# Patient Record
(demographics unavailable — no encounter records)

---

## 2025-03-04 NOTE — DISCUSSION/SUMMARY
[de-identified] : General Dx Discussion The patient was advised of the diagnosis. The natural history of the pathology was explained in full to the patient in layman's terms. All questions were answered. The risks and benefits of surgical and non-surgical treatment alternatives were explained in full to the patient.  Case Discussed. Will get an mri right shoulder for further evaluation. In the interim recommend Naprosyn 500mg daily for 5 days, then as needed. Flexeril 5mg once at night as needed. Will refer to Dr. Schultz/[facility] for further evaluation of her neck. f/u after mri shoulder.  Patient was given a prescription for an anti-inflammatory medication.  They will take it for the next 5-7 days and then on an as needed basis, as long as there are no medical contra-indications.  Patient is counseled on possible GI and blood pressure side effects.    Entered by Megan BLANK acting as a scribe. Instructions: Dr. Lewis- The documentation recorded by the scribe accurately reflects the service I personally performed and the decisions made by me.

## 2025-03-04 NOTE — DATA REVIEWED
[Outside X-rays] : outside x-rays [Right] : of the right [Shoulder] : shoulder [Report was reviewed and noted in the chart] : The report was reviewed and noted in the chart [FreeTextEntry1] : antoni 2/27/25  normal right shoulder radiographs
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
## 2025-03-04 NOTE — DISCUSSION/SUMMARY
[de-identified] : General Dx Discussion The patient was advised of the diagnosis. The natural history of the pathology was explained in full to the patient in layman's terms. All questions were answered. The risks and benefits of surgical and non-surgical treatment alternatives were explained in full to the patient.  Case Discussed. Will get an mri right shoulder for further evaluation. In the interim recommend Naprosyn 500mg daily for 5 days, then as needed. Flexeril 5mg once at night as needed. Will refer to Dr. Schultz/[facility] for further evaluation of her neck. f/u after mri shoulder.  Patient was given a prescription for an anti-inflammatory medication.  They will take it for the next 5-7 days and then on an as needed basis, as long as there are no medical contra-indications.  Patient is counseled on possible GI and blood pressure side effects.    Entered by Megan BLANK acting as a scribe. Instructions: Dr. Lewis- The documentation recorded by the scribe accurately reflects the service I personally performed and the decisions made by me.

## 2025-03-04 NOTE — PHYSICAL EXAM
[Right] : right shoulder [Standing] : standing [Mild] : mild [] : discomfort with strength testing [TWNoteComboBox7] : active forward flexion 130 degrees
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
## 2025-03-18 NOTE — DISCUSSION/SUMMARY
[de-identified] : General Dx Discussion The patient was advised of the diagnosis. The natural history of the pathology was explained in full to the patient in layman's terms. All questions were answered. The risks and benefits of surgical and non-surgical treatment alternatives were explained in full to the patient.  Case Discussed. will try a injection and PT  poss of surgery discussed  she is going to see Dr Schultz for her neck

## 2025-03-18 NOTE — PHYSICAL EXAM
[Right] : right shoulder [5 ___] : forward flexion 5[unfilled]/5 [5___] : internal rotation 5[unfilled]/5 [] : discomfort with strength testing [TWNoteComboBox7] : active forward flexion 120 degrees [de-identified] : active abduction 75 degrees [TWNoteComboBox6] : internal rotation 50 degrees [de-identified] : external rotation 55 degrees

## 2025-03-18 NOTE — PROCEDURE
[Large Joint Injection] : Large joint injection [Right] : of the right [Subacromial Space] : subacromial space [Pain] : pain [Inflammation] : inflammation [Alcohol] : alcohol [Betadine] : betadine [Ethyl Chloride sprayed topically] : ethyl chloride sprayed topically [Sterile technique used] : sterile technique used [___ cc    3mg] :  Betamethasone (Celestone) ~Vcc of 3mg [___ cc    1%] : Lidocaine ~Vcc of 1%  [___ cc    0.25%] : Bupivacaine (Marcaine) ~Vcc of 0.25%  [] : Patient tolerated procedure well [Call if redness, pain or fever occur] : call if redness, pain or fever occur [Apply ice for 15min out of every hour for the next 12-24 hours as tolerated] : apply ice for 15 minutes out of every hour for the next 12-24 hours as tolerated [Previous OTC use and PT nontherapeutic] : patient has tried OTC's including aspirin, Ibuprofen, Aleve, etc or prescription NSAIDS, and/or exercises at home and/or physical therapy without satisfactory response [Patient had decreased mobility in the joint] : patient had decreased mobility in the joint [Risks, benefits, alternatives discussed / Verbal consent obtained] : the risks benefits, and alternatives have been discussed, and verbal consent was obtained [Prior failure or difficult injection] : prior failure or difficult injection [All ultrasound images have been permanently captured and stored accordingly in our picture archiving and communication system] : All ultrasound images have been permanently captured and stored accordingly in our picture archiving and communication system [Visualization of the needle and placement of injection was performed without complication] : visualization of the needle and placement of injection was performed without complication

## 2025-03-18 NOTE — DATA REVIEWED
[Report was reviewed and noted in the chart] : The report was reviewed and noted in the chart [I independently reviewed and interpreted images and report] : I independently reviewed and interpreted images and report [I reviewed the films/CD] : I reviewed the films/CD [FreeTextEntry1] : near full thickness RCT SLAP synovitis bursitis impingement
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
## 2025-03-21 NOTE — PHYSICAL EXAM
[de-identified] : Constitutional: - General Appearance: Unremarkable Body Habitus Well Developed Well Nourished Body Habitus No Deformities Well Groomed Ability To communicate: Normal Neurologic: Global sensation is intact to upper and lower extremities. See examination of Neck and/or Spine for exceptions. Orientation to Time, Place and Person is: Normal Mood And Affect is Normal Skin: - Head/Face, Right Upper/Lower Extremity, Left Upper/Lower Extremity: Normal See Examination of Neck and/or Spine for exceptions Cardiovascular: Peripheral Cardiovascular System is Normal Palpation of Lymph Nodes: Normal Palpation of lymph nodes in: Axilla, Cervical, Inguinal Abnormal Palpation of lymph nodes in: None  [Biceps 2+] : biceps 2+ [Triceps 2+] : triceps 2+ [Brachioradialis 2+] : brachioradialis 2+ [] : negative Miller reflex [TWNoteComboBox7] : forward flexion 20 degrees [de-identified] : extension 10 degrees [de-identified] : left lateral rotation 45 degrees [TWNoteComboBox6] : right lateral rotation 45 degrees
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
## 2025-03-21 NOTE — DATA REVIEWED
[FreeTextEntry1] : On my interpretations of these images from [facility] in house on 03/21/2025: I have independently reviewed and interpreted these images.  4 V cervical XR- mod-adv of C3-7, flex/ex XRs reveal limited extension and flexion but no instability.

## 2025-04-23 NOTE — DISCUSSION/SUMMARY
[de-identified] : General Dx Discussion The patient was advised of the diagnosis. The natural history of the pathology was explained in full to the patient in layman's terms. All questions were answered. The risks and benefits of surgical and non-surgical treatment alternatives were explained in full to the patient.  Case Discussed. Surg/Nonsurg options discussed. She has failed conservative treatment therefore would recommend surgical procedure at this time.  MRI reviewed again has with pain and loss of motion  Arthroscopy with decompression, debridement RCR discussed Risks, benefits and alternatives discussed. Risks include, but are not limited to infection, blood clot, nerve damage, recurrent tear, loss of motion, continued pain, worsened pain, need for another surgery in the future. Recurrent tear discussed  Discussed that the surgery will not address any pain that she has from any OA she may have. She understands he will not be 100% better after surgery. Prolonged immobilization and rehabilitation discussed. Work limitations discussed. Questions answered. She expressed understanding and would like to proceed. She is seeing Dr Schultz for her neck.   The patient was advised of the diagnosis.  The natural history of the pathology was explained in full to the patient in layman's terms. All questions were answered.  The risks and benefits of surgical and non-surgical treatment alternatives were explained in full to the patient.   The patient demonstrated a full understanding of the surgical and non-surgical options.  The risks of surgery were outlined in full to the patient including but not limited to bleeding, scarring, infection, sepsis, neurologic injury, vascular injury, failure to resolve symptoms, symptom recurrence, the need for further surgery, non-healing, wound breakdown, deep vein thrombosis, pulmonary embolism, spontaneous osteonecrosis, anesthesia complications and even death.  The patient understood all the risks and accepted them and understood that other complications could occur that are not mentioned above.  The intraoperative plan, post-operative plan, post-operative expectations and limitations were explained in full.  Expectations from non-surgical treatment were explained in full as well.  The patient demonstrated a complete understanding of the treatment alternatives and requested the above-mentioned procedure.  This will be scheduled accordingly.    Entered by Megan BLANK acting as a scribe. Instructions: Dr. Lewis- The documentation recorded by the scribe accurately reflects the service I personally performed and the decisions made by me.
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
## 2025-04-23 NOTE — DISCUSSION/SUMMARY
[de-identified] : General Dx Discussion The patient was advised of the diagnosis. The natural history of the pathology was explained in full to the patient in layman's terms. All questions were answered. The risks and benefits of surgical and non-surgical treatment alternatives were explained in full to the patient.  Case Discussed. Surg/Nonsurg options discussed. She has failed conservative treatment therefore would recommend surgical procedure at this time.  MRI reviewed again has with pain and loss of motion  Arthroscopy with decompression, debridement RCR discussed Risks, benefits and alternatives discussed. Risks include, but are not limited to infection, blood clot, nerve damage, recurrent tear, loss of motion, continued pain, worsened pain, need for another surgery in the future. Recurrent tear discussed  Discussed that the surgery will not address any pain that she has from any OA she may have. She understands he will not be 100% better after surgery. Prolonged immobilization and rehabilitation discussed. Work limitations discussed. Questions answered. She expressed understanding and would like to proceed. She is seeing Dr Schultz for her neck.   The patient was advised of the diagnosis.  The natural history of the pathology was explained in full to the patient in layman's terms. All questions were answered.  The risks and benefits of surgical and non-surgical treatment alternatives were explained in full to the patient.   The patient demonstrated a full understanding of the surgical and non-surgical options.  The risks of surgery were outlined in full to the patient including but not limited to bleeding, scarring, infection, sepsis, neurologic injury, vascular injury, failure to resolve symptoms, symptom recurrence, the need for further surgery, non-healing, wound breakdown, deep vein thrombosis, pulmonary embolism, spontaneous osteonecrosis, anesthesia complications and even death.  The patient understood all the risks and accepted them and understood that other complications could occur that are not mentioned above.  The intraoperative plan, post-operative plan, post-operative expectations and limitations were explained in full.  Expectations from non-surgical treatment were explained in full as well.  The patient demonstrated a complete understanding of the treatment alternatives and requested the above-mentioned procedure.  This will be scheduled accordingly.    Entered by Megan BLANK acting as a scribe. Instructions: Dr. Lewis- The documentation recorded by the scribe accurately reflects the service I personally performed and the decisions made by me.
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
## 2025-04-23 NOTE — PHYSICAL EXAM
[Right] : right shoulder [5 ___] : forward flexion 5[unfilled]/5 [5___] : internal rotation 5[unfilled]/5 [] : discomfort with strength testing [TWNoteComboBox7] : active forward flexion 120 degrees [de-identified] : active abduction 75 degrees [TWNoteComboBox6] : internal rotation 50 degrees [de-identified] : external rotation 55 degrees

## 2025-04-23 NOTE — PHYSICAL EXAM
[Right] : right shoulder [5 ___] : forward flexion 5[unfilled]/5 [5___] : internal rotation 5[unfilled]/5 [] : discomfort with strength testing [TWNoteComboBox7] : active forward flexion 120 degrees [de-identified] : active abduction 75 degrees [TWNoteComboBox6] : internal rotation 50 degrees [de-identified] : external rotation 55 degrees

## 2025-05-16 NOTE — PHYSICAL EXAM
[Biceps 2+] : biceps 2+ [Triceps 2+] : triceps 2+ [Brachioradialis 2+] : brachioradialis 2+ [de-identified] : Constitutional: - General Appearance: Unremarkable Body Habitus Well Developed Well Nourished Body Habitus No Deformities Well Groomed Ability To communicate: Normal Neurologic: Global sensation is intact to upper and lower extremities. See examination of Neck and/or Spine for exceptions. Orientation to Time, Place and Person is: Normal Mood And Affect is Normal Skin: - Head/Face, Right Upper/Lower Extremity, Left Upper/Lower Extremity: Normal See Examination of Neck and/or Spine for exceptions Cardiovascular: Peripheral Cardiovascular System is Normal Palpation of Lymph Nodes: Normal Palpation of lymph nodes in: Axilla, Cervical, Inguinal Abnormal Palpation of lymph nodes in: None  [] : negative Miller reflex [FreeTextEntry8] : tenderness over spinous process.  [FreeTextEntry9] : painful rotation L>R [TWNoteComboBox7] : False [de-identified] : extension 10 degrees [de-identified] : left lateral rotation 45 degrees [TWNoteComboBox6] : right lateral rotation 45 degrees
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
## 2025-05-16 NOTE — DATA REVIEWED
[FreeTextEntry1] : On my interpretation of these images from 3/22/25 on [facility]. I have additionally reviewed the radiologist report.  MRI-  C2-3: NL C3-4: mild DDD, central protrusion, mild BL FS.  C4-5:  mild-mod DDD, mod RT FS, mild-mod central, abutment but no compression of spinal cord  C5-6:  mod DDD w/ disc osteophyte, results in mod central and BL foraminal stenosis. very mild LT side spinal cord compression.   C6-7: mild DDD, mild central stenosis.  C7-T1 mild DDD, no stenosis.   RT shoulder MRI 3/8/25. Severe partial tears of RTC w/ bursitis, labral tear, biceps tendon, AC straight.   On my interpretations of these images from [facility] in house on 03/21/2025: I have independently reviewed and interpreted these images.  4 V cervical XR- mod-adv of C3-7, flex/ex XRs reveal limited extension and flexion but no instability.

## 2025-05-20 NOTE — DATA REVIEWED
[Right] : of the right [Shoulder] : shoulder [MRI] : MRI [Cervical Spine] : cervical spine [Report was reviewed and noted in the chart] : The report was reviewed and noted in the chart [I reviewed the films/CD] : I reviewed the films/CD
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
## 2025-05-22 NOTE — ASSESSMENT
[FreeTextEntry1] : We discussed the nature of the underlying pathology and available pain management treatment options. These included interventional, rehabilitative, pharmacological, and complementary modalities. We will proceed with the following:    Interventional treatment options: - Proceed with _ with fluoroscopic guidance - None indicated at present time  - see additional instructions below    Rehabilitative options: - Initiate trial of physical therapy - Continue physical therapy - Participation in active HEP was discussed and encouraged as tolerated - Home exercise sheet provided   Medication based treatment options: - Initiate trial of _ - Continue _ - See additional instructions below    Complementary treatment options: - Weight management and lifestyle modifications discussed   Additional treatment recommendations as follows: - patient will follow up _  ITess, acting as scribe, attest that this documentation has been prepared under the direction and in the presence of Provider Chapo Ziegler DO.  The documentation recorded by the scribe, in my presence, accurately reflects the service I personally performed, and the decisions made by me with my edits as appropriate.

## 2025-05-22 NOTE — PHYSICAL EXAM
[de-identified] : Constitutional: - No acute distress - Well developed; well nourished   Neurological: - normal mood and affect - alert and oriented x 3   Cardiovascular: - grossly normal  Cervical Spine Exam:   Inspection: erythema (-) ecchymosis (-) rashes (-)   Palpation:                                               Cervical paraspinal tenderness:         R (-); L (-) Upper trapezius tenderness:              R (-); L (-) Rhomboids tenderness:                      R (-); L (-) Occipital Ridge:                                   R (-); L (-) Supraspinatus tenderness:                 R (-); L (-)   ROM: WNL   Strength Testing:            Right    Left Deltoid                             (5/5)    (5/5) Biceps:                            (5/5)    (5/5) Triceps:                           (5/5)    (5/5) Finger Abductors:           (5/5)    (5/5) Grasp:                             (5/5)    (5/5)   Special Testing: Spurling Test:                  R (-); L (-) Facet load test:               R (-); L (-) Miller test:                  R (-); L (-)   Neuro: SILT throughout right upper extremity SI LT throughout left upper extremity   Reflexes: Biceps   -           R (2+); L (2+) Triceps  -           R (2+); L (2+) Brachioradialis- R (2+); L (2+)   No ankle clonus

## 2025-05-22 NOTE — PHYSICAL EXAM
[de-identified] : Constitutional: - No acute distress - Well developed; well nourished   Neurological: - normal mood and affect - alert and oriented x 3   Cardiovascular: - grossly normal  Cervical Spine Exam:   Inspection: erythema (-) ecchymosis (-) rashes (-)   Palpation:                                               Cervical paraspinal tenderness:         R (-); L (-) Upper trapezius tenderness:              R (-); L (-) Rhomboids tenderness:                      R (-); L (-) Occipital Ridge:                                   R (-); L (-) Supraspinatus tenderness:                 R (-); L (-)   ROM: WNL   Strength Testing:            Right    Left Deltoid                             (5/5)    (5/5) Biceps:                            (5/5)    (5/5) Triceps:                           (5/5)    (5/5) Finger Abductors:           (5/5)    (5/5) Grasp:                             (5/5)    (5/5)   Special Testing: Spurling Test:                  R (-); L (-) Facet load test:               R (-); L (-) Miller test:                  R (-); L (-)   Neuro: SILT throughout right upper extremity SI LT throughout left upper extremity   Reflexes: Biceps   -           R (2+); L (2+) Triceps  -           R (2+); L (2+) Brachioradialis- R (2+); L (2+)   No ankle clonus
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
## 2025-05-27 NOTE — DISCUSSION/SUMMARY
[de-identified] : General Dx Discussion The patient was advised of the diagnosis. The natural history of the pathology was explained in full to the patient in layman's terms. All questions were answered. The risks and benefits of surgical and non-surgical treatment alternatives were explained in full to the patient.  Case Discussed. Surgical Pictures reviewed.  Continue immobilizer at all times.  Will go for a course of PT for Passive ROM only. f/u 2 weeks.   Entered by Megan BLANK acting as a scribe. Instructions: Dr. Lewis- The documentation recorded by the scribe accurately reflects the service I personally performed and the decisions made by me.
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
## 2025-05-27 NOTE — DISCUSSION/SUMMARY
[de-identified] : General Dx Discussion The patient was advised of the diagnosis. The natural history of the pathology was explained in full to the patient in layman's terms. All questions were answered. The risks and benefits of surgical and non-surgical treatment alternatives were explained in full to the patient.  Case Discussed. Surgical Pictures reviewed.  Continue immobilizer at all times.  Will go for a course of PT for Passive ROM only. f/u 2 weeks.   Entered by Megan BLANK acting as a scribe. Instructions: Dr. Lewis- The documentation recorded by the scribe accurately reflects the service I personally performed and the decisions made by me.

## 2025-06-10 NOTE — DISCUSSION/SUMMARY
[de-identified] : General Dx Discussion The patient was advised of the diagnosis. The natural history of the pathology was explained in full to the patient in layman's terms. All questions were answered. The risks and benefits of surgical and non-surgical treatment alternatives were explained in full to the patient.  Case Discussed. Continue immobilizer at all times.  Will cont  for Passive ROM only will advance passive rom  f/u 2 weeks.   Entered by Megan BLANK acting as a scribe. Instructions: Dr. Lewis- The documentation recorded by the scribe accurately reflects the service I personally performed and the decisions made by me.

## 2025-06-10 NOTE — PHYSICAL EXAM
[Right] : right shoulder [] : healed incision [TWNoteComboBox4] : passive forward flexion 80 degrees
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
## 2025-06-24 NOTE — DISCUSSION/SUMMARY
[de-identified] : General Dx Discussion The patient was advised of the diagnosis. The natural history of the pathology was explained in full to the patient in layman's terms. All questions were answered. The risks and benefits of surgical and non-surgical treatment alternatives were explained in full to the patient.  Case Discussed, now 5 weeks S/P RCR, GHD, SAD, partial synovectomy, doing well. Continue immobilizer while outside her home/ in public places Will continue PT, may increase to 3 x a week active rom may start no lifting away from her body  Meloxicam with GI precautions, reviewed timing and frequency f/u 4 weeks  Entered by Charlotte BLANK acting as a scribe. Instructions: Dr. Lewis- The documentation recorded by the scribe accurately reflects the service I personally performed, and the decisions made by me.

## 2025-06-24 NOTE — PHYSICAL EXAM
[Right] : right shoulder [Sitting] : sitting [Mild] : mild [] : no swelling [TWNoteComboBox7] : active forward flexion 150 degrees
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
## 2025-06-25 NOTE — DISCUSSION/SUMMARY
[de-identified] : 59 Y F w/ cervical strain, cervical spondylosis & FS. Minimal cervical stiffness & aches.  Patient was provided with a referral for cervical physical therapy to work on stretching, strengthening and range of motion. Patient was provided with a cervical home exercise program. Discussed proper body mechanics and modified physical activity to avoid aggravation of symptoms. Will hold off on CESIs as she is improving.   F/U in 2 months.  Prior to appointment and during encounter with patient extensive medical records were reviewed including but not limited to, hospital records, out patient records, imaging results, and lab data. During this appointment the patient was examined, diagnoses were discussed and explained in a face to face manner. In addition extensive time was spent reviewing aforementioned diagnostic studies. Counseling including abnormal image results, differential diagnoses, treatment options, risk and benefits, lifestyle changes, current condition, and current medications was performed. Patient's comments, questions, and concerns were address and patient verbalized understanding. Based on this patient's presentation at our office, which is an orthopedic spine surgeon's office, this patient inherently / intrinsically has a risk, however minute, of developing issues such as Cauda equina syndrome, bowel and bladder changes, or progression of motor or neurological deficits such as paralysis which may be permanent.    I, Cande Navarrete, attest that this documentation has been prepared under the direction and in the presence of provider Marcos Schultz MD.

## 2025-06-25 NOTE — DATA REVIEWED
[FreeTextEntry1] : On my interpretation of these images from 3/22/25 on [facility]. I have additionally reviewed the radiologist report.  MRI-  C2-3: NL C3-4: mild DDD, central protrusion, mild BL FS.  C4-5:  mild-mod DDD, mod RT FS, mild-mod central, abutment but no compression of spinal cord  C5-6:  mod DDD w/ disc osteophyte, results in mod central and BL foraminal stenosis. very mild LT side spinal cord compression.   C6-7: mild DDD, mild central stenosis.  C7-T1 mild DDD, no stenosis.   RT shoulder MRI 3/8/25. Severe partial tears of RTC w/ bursitis, labral tear, biceps tendon, AC straight.   On my interpretations of these images from [facility] in house on 03/21/2025: I have independently reviewed and interpreted these images.  4 V cervical XR- mod-adv of C3-7, flex/ex XRs reveal limited extension and flexion but no instability.

## 2025-07-22 NOTE — DISCUSSION/SUMMARY
[de-identified] : General Dx Discussion The patient was advised of the diagnosis. The natural history of the pathology was explained in full to the patient in layman's terms. All questions were answered. The risks and benefits of surgical and non-surgical treatment alternatives were explained in full to the patient.  will cont PT doing well f/u 4-5 weeks  resume traveling to work 8/11/25
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
## 2025-07-22 NOTE — PHYSICAL EXAM
[Right] : right shoulder [] : strength is improving [TWNoteComboBox7] : active forward flexion 120 degrees [de-identified] : active abduction 90 degrees
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
